# Patient Record
Sex: MALE | Race: WHITE | NOT HISPANIC OR LATINO
[De-identification: names, ages, dates, MRNs, and addresses within clinical notes are randomized per-mention and may not be internally consistent; named-entity substitution may affect disease eponyms.]

---

## 2017-02-27 ENCOUNTER — RX RENEWAL (OUTPATIENT)
Age: 62
End: 2017-02-27

## 2017-03-07 ENCOUNTER — APPOINTMENT (OUTPATIENT)
Dept: DERMATOLOGY | Facility: CLINIC | Age: 62
End: 2017-03-07

## 2017-03-10 ENCOUNTER — NON-APPOINTMENT (OUTPATIENT)
Age: 62
End: 2017-03-10

## 2017-03-10 ENCOUNTER — APPOINTMENT (OUTPATIENT)
Dept: INTERNAL MEDICINE | Facility: CLINIC | Age: 62
End: 2017-03-10

## 2017-03-10 VITALS — WEIGHT: 280 LBS | BODY MASS INDEX: 35.56 KG/M2 | HEIGHT: 74.5 IN

## 2017-03-10 DIAGNOSIS — D68.59 OTHER PRIMARY THROMBOPHILIA: ICD-10-CM

## 2017-03-10 DIAGNOSIS — Z87.438 PERSONAL HISTORY OF OTHER DISEASES OF MALE GENITAL ORGANS: ICD-10-CM

## 2017-03-10 DIAGNOSIS — Z00.00 ENCOUNTER FOR GENERAL ADULT MEDICAL EXAMINATION W/OUT ABNORMAL FINDINGS: ICD-10-CM

## 2017-03-11 ENCOUNTER — TRANSCRIPTION ENCOUNTER (OUTPATIENT)
Age: 62
End: 2017-03-11

## 2017-03-13 ENCOUNTER — RESULT REVIEW (OUTPATIENT)
Age: 62
End: 2017-03-13

## 2017-03-13 LAB
BILIRUB UR QL STRIP: NORMAL
CLARITY UR: CLEAR
COLLECTION METHOD: NORMAL
GLUCOSE UR-MCNC: NORMAL
HCG UR QL: 0.2 EU/DL
HGB UR QL STRIP.AUTO: NORMAL
KETONES UR-MCNC: NORMAL
LEUKOCYTE ESTERASE UR QL STRIP: NORMAL
NITRITE UR QL STRIP: NORMAL
PH UR STRIP: 7
PROT UR STRIP-MCNC: NORMAL
SP GR UR STRIP: 1.01

## 2017-08-01 ENCOUNTER — APPOINTMENT (OUTPATIENT)
Dept: GASTROENTEROLOGY | Facility: CLINIC | Age: 62
End: 2017-08-01
Payer: COMMERCIAL

## 2017-08-01 VITALS
HEART RATE: 92 BPM | SYSTOLIC BLOOD PRESSURE: 130 MMHG | WEIGHT: 263 LBS | HEIGHT: 74 IN | RESPIRATION RATE: 16 BRPM | OXYGEN SATURATION: 98 % | DIASTOLIC BLOOD PRESSURE: 80 MMHG | BODY MASS INDEX: 33.75 KG/M2

## 2017-08-01 DIAGNOSIS — Z80.0 FAMILY HISTORY OF MALIGNANT NEOPLASM OF DIGESTIVE ORGANS: ICD-10-CM

## 2017-08-01 PROCEDURE — 82270 OCCULT BLOOD FECES: CPT

## 2017-08-01 PROCEDURE — 99203 OFFICE O/P NEW LOW 30 MIN: CPT

## 2017-08-01 RX ORDER — SODIUM PICOSULFATE, MAGNESIUM OXIDE, AND ANHYDROUS CITRIC ACID 10; 3.5; 12 MG/16.2G; G/16.2G; G/16.2G
10-3.5-12 POWDER, METERED ORAL
Qty: 1 | Refills: 0 | Status: ACTIVE | COMMUNITY
Start: 2017-08-01 | End: 1900-01-01

## 2017-09-06 ENCOUNTER — RX RENEWAL (OUTPATIENT)
Age: 62
End: 2017-09-06

## 2017-09-06 RX ORDER — WARFARIN 10 MG/1
10 TABLET ORAL DAILY
Qty: 12 | Refills: 0 | Status: ACTIVE | COMMUNITY
Start: 2017-09-06 | End: 1900-01-01

## 2017-09-28 ENCOUNTER — APPOINTMENT (OUTPATIENT)
Dept: GASTROENTEROLOGY | Facility: GI CENTER | Age: 62
End: 2017-09-28

## 2018-03-13 ENCOUNTER — APPOINTMENT (OUTPATIENT)
Dept: DERMATOLOGY | Facility: CLINIC | Age: 63
End: 2018-03-13

## 2018-07-23 PROBLEM — Z80.0 FAMILY HISTORY OF COLON CANCER: Status: ACTIVE | Noted: 2017-08-01

## 2023-01-01 ENCOUNTER — INPATIENT (INPATIENT)
Facility: HOSPITAL | Age: 68
LOS: 0 days | DRG: 951 | End: 2023-07-30
Attending: HOSPITALIST | Admitting: HOSPITALIST
Payer: MEDICARE

## 2023-01-01 VITALS
SYSTOLIC BLOOD PRESSURE: 151 MMHG | DIASTOLIC BLOOD PRESSURE: 80 MMHG | OXYGEN SATURATION: 100 % | RESPIRATION RATE: 16 BRPM | HEART RATE: 53 BPM

## 2023-01-01 VITALS — HEART RATE: 57 BPM | WEIGHT: 220.02 LBS | OXYGEN SATURATION: 100 %

## 2023-01-01 DIAGNOSIS — I62.00 NONTRAUMATIC SUBDURAL HEMORRHAGE, UNSPECIFIED: ICD-10-CM

## 2023-01-01 LAB
ACANTHOCYTES BLD QL SMEAR: SIGNIFICANT CHANGE UP
ALBUMIN SERPL ELPH-MCNC: 4.4 G/DL — SIGNIFICANT CHANGE UP (ref 3.3–5.2)
ALP SERPL-CCNC: 231 U/L — HIGH (ref 40–120)
ALT FLD-CCNC: 23 U/L — SIGNIFICANT CHANGE UP
AMPHET UR-MCNC: NEGATIVE — SIGNIFICANT CHANGE UP
ANION GAP SERPL CALC-SCNC: 18 MMOL/L — HIGH (ref 5–17)
ANISOCYTOSIS BLD QL: SIGNIFICANT CHANGE UP
APTT BLD: 49.1 SEC — HIGH (ref 24.5–35.6)
AST SERPL-CCNC: 45 U/L — HIGH
BARBITURATES UR SCN-MCNC: NEGATIVE — SIGNIFICANT CHANGE UP
BASOPHILS # BLD AUTO: 0 K/UL — SIGNIFICANT CHANGE UP (ref 0–0.2)
BASOPHILS NFR BLD AUTO: 0 % — SIGNIFICANT CHANGE UP (ref 0–2)
BENZODIAZ UR-MCNC: NEGATIVE — SIGNIFICANT CHANGE UP
BILIRUB SERPL-MCNC: 0.9 MG/DL — SIGNIFICANT CHANGE UP (ref 0.4–2)
BUN SERPL-MCNC: 15.7 MG/DL — SIGNIFICANT CHANGE UP (ref 8–20)
CALCIUM SERPL-MCNC: 9.1 MG/DL — SIGNIFICANT CHANGE UP (ref 8.4–10.5)
CHLORIDE SERPL-SCNC: 103 MMOL/L — SIGNIFICANT CHANGE UP (ref 96–108)
CO2 SERPL-SCNC: 18 MMOL/L — LOW (ref 22–29)
COCAINE METAB.OTHER UR-MCNC: NEGATIVE — SIGNIFICANT CHANGE UP
CREAT SERPL-MCNC: 0.67 MG/DL — SIGNIFICANT CHANGE UP (ref 0.5–1.3)
DACRYOCYTES BLD QL SMEAR: SIGNIFICANT CHANGE UP
EGFR: 100 ML/MIN/1.73M2 — SIGNIFICANT CHANGE UP
ELLIPTOCYTES BLD QL SMEAR: SIGNIFICANT CHANGE UP
EOSINOPHIL # BLD AUTO: 0 K/UL — SIGNIFICANT CHANGE UP (ref 0–0.5)
EOSINOPHIL NFR BLD AUTO: 0 % — SIGNIFICANT CHANGE UP (ref 0–6)
ETHANOL SERPL-MCNC: <10 MG/DL — SIGNIFICANT CHANGE UP (ref 0–9)
GIANT PLATELETS BLD QL SMEAR: PRESENT — SIGNIFICANT CHANGE UP
GLUCOSE SERPL-MCNC: 200 MG/DL — HIGH (ref 70–99)
HCT VFR BLD CALC: 36.2 % — LOW (ref 39–50)
HGB BLD-MCNC: 11.6 G/DL — LOW (ref 13–17)
INR BLD: 5 RATIO — CRITICAL HIGH (ref 0.85–1.18)
LYMPHOCYTES # BLD AUTO: 2.47 K/UL — SIGNIFICANT CHANGE UP (ref 1–3.3)
LYMPHOCYTES # BLD AUTO: 36.6 % — SIGNIFICANT CHANGE UP (ref 13–44)
MANUAL SMEAR VERIFICATION: SIGNIFICANT CHANGE UP
MCHC RBC-ENTMCNC: 27.7 PG — SIGNIFICANT CHANGE UP (ref 27–34)
MCHC RBC-ENTMCNC: 32 GM/DL — SIGNIFICANT CHANGE UP (ref 32–36)
MCV RBC AUTO: 86.4 FL — SIGNIFICANT CHANGE UP (ref 80–100)
METAMYELOCYTES # FLD: 8 % — HIGH (ref 0–0)
METHADONE UR-MCNC: NEGATIVE — SIGNIFICANT CHANGE UP
MICROCYTES BLD QL: SIGNIFICANT CHANGE UP
MONOCYTES # BLD AUTO: 0.3 K/UL — SIGNIFICANT CHANGE UP (ref 0–0.9)
MONOCYTES NFR BLD AUTO: 4.5 % — SIGNIFICANT CHANGE UP (ref 2–14)
MYELOCYTES NFR BLD: 4.5 % — HIGH (ref 0–0)
NEUTROPHILS # BLD AUTO: 3.14 K/UL — SIGNIFICANT CHANGE UP (ref 1.8–7.4)
NEUTROPHILS NFR BLD AUTO: 45.5 % — SIGNIFICANT CHANGE UP (ref 43–77)
NEUTS BAND # BLD: 0.9 % — SIGNIFICANT CHANGE UP (ref 0–8)
NRBC # BLD: 14 /100 — HIGH (ref 0–0)
OPIATES UR-MCNC: NEGATIVE — SIGNIFICANT CHANGE UP
OVALOCYTES BLD QL SMEAR: SIGNIFICANT CHANGE UP
PCP SPEC-MCNC: SIGNIFICANT CHANGE UP
PCP UR-MCNC: NEGATIVE — SIGNIFICANT CHANGE UP
PLAT MORPH BLD: NORMAL — SIGNIFICANT CHANGE UP
PLATELET # BLD AUTO: SIGNIFICANT CHANGE UP K/UL (ref 150–400)
POIKILOCYTOSIS BLD QL AUTO: SIGNIFICANT CHANGE UP
POLYCHROMASIA BLD QL SMEAR: SIGNIFICANT CHANGE UP
POTASSIUM SERPL-MCNC: 4.3 MMOL/L — SIGNIFICANT CHANGE UP (ref 3.5–5.3)
POTASSIUM SERPL-SCNC: 4.3 MMOL/L — SIGNIFICANT CHANGE UP (ref 3.5–5.3)
PROT SERPL-MCNC: 7.6 G/DL — SIGNIFICANT CHANGE UP (ref 6.6–8.7)
PROTHROM AB SERPL-ACNC: 53 SEC — HIGH (ref 9.5–13)
RBC # BLD: 4.19 M/UL — LOW (ref 4.2–5.8)
RBC # FLD: 21.5 % — HIGH (ref 10.3–14.5)
RBC BLD AUTO: SIGNIFICANT CHANGE UP
SCHISTOCYTES BLD QL AUTO: SIGNIFICANT CHANGE UP
SODIUM SERPL-SCNC: 139 MMOL/L — SIGNIFICANT CHANGE UP (ref 135–145)
THC UR QL: NEGATIVE — SIGNIFICANT CHANGE UP
TROPONIN T SERPL-MCNC: <0.01 NG/ML — SIGNIFICANT CHANGE UP (ref 0–0.06)
WBC # BLD: 6.76 K/UL — SIGNIFICANT CHANGE UP (ref 3.8–10.5)
WBC # FLD AUTO: 6.76 K/UL — SIGNIFICANT CHANGE UP (ref 3.8–10.5)

## 2023-01-01 PROCEDURE — 70496 CT ANGIOGRAPHY HEAD: CPT | Mod: 26,MA

## 2023-01-01 PROCEDURE — 99222 1ST HOSP IP/OBS MODERATE 55: CPT

## 2023-01-01 PROCEDURE — 70498 CT ANGIOGRAPHY NECK: CPT | Mod: 26,MA

## 2023-01-01 PROCEDURE — 31500 INSERT EMERGENCY AIRWAY: CPT

## 2023-01-01 PROCEDURE — 72125 CT NECK SPINE W/O DYE: CPT | Mod: 26

## 2023-01-01 PROCEDURE — 93010 ELECTROCARDIOGRAM REPORT: CPT

## 2023-01-01 PROCEDURE — 99291 CRITICAL CARE FIRST HOUR: CPT | Mod: 25

## 2023-01-01 PROCEDURE — 71045 X-RAY EXAM CHEST 1 VIEW: CPT | Mod: 26

## 2023-01-01 PROCEDURE — 70450 CT HEAD/BRAIN W/O DYE: CPT | Mod: 26,MA

## 2023-01-01 RX ORDER — PROTHROMBIN COMPLEX CONCENTRATE (HUMAN) 25.5; 16.5; 24; 22; 22; 26 [IU]/ML; [IU]/ML; [IU]/ML; [IU]/ML; [IU]/ML; [IU]/ML
1500 POWDER, FOR SOLUTION INTRAVENOUS ONCE
Refills: 0 | Status: COMPLETED | OUTPATIENT
Start: 2023-01-01 | End: 2023-01-01

## 2023-01-01 RX ORDER — FENTANYL CITRATE 50 UG/ML
100 INJECTION INTRAVENOUS ONCE
Refills: 0 | Status: DISCONTINUED | OUTPATIENT
Start: 2023-01-01 | End: 2023-01-01

## 2023-01-01 RX ORDER — CHLORHEXIDINE GLUCONATE 213 G/1000ML
15 SOLUTION TOPICAL EVERY 12 HOURS
Refills: 0 | Status: DISCONTINUED | OUTPATIENT
Start: 2023-01-01 | End: 2023-01-01

## 2023-01-01 RX ORDER — MORPHINE SULFATE 50 MG/1
2 CAPSULE, EXTENDED RELEASE ORAL EVERY 4 HOURS
Refills: 0 | Status: DISCONTINUED | OUTPATIENT
Start: 2023-01-01 | End: 2023-01-01

## 2023-01-01 RX ORDER — HYDRALAZINE HCL 50 MG
10 TABLET ORAL ONCE
Refills: 0 | Status: COMPLETED | OUTPATIENT
Start: 2023-01-01 | End: 2023-01-01

## 2023-01-01 RX ORDER — ETOMIDATE 2 MG/ML
20 INJECTION INTRAVENOUS ONCE
Refills: 0 | Status: COMPLETED | OUTPATIENT
Start: 2023-01-01 | End: 2023-01-01

## 2023-01-01 RX ORDER — SUCCINYLCHOLINE CHLORIDE 100 MG/5ML
100 SYRINGE (ML) INTRAVENOUS ONCE
Refills: 0 | Status: COMPLETED | OUTPATIENT
Start: 2023-01-01 | End: 2023-01-01

## 2023-01-01 RX ORDER — PHYTONADIONE (VIT K1) 5 MG
10 TABLET ORAL ONCE
Refills: 0 | Status: COMPLETED | OUTPATIENT
Start: 2023-01-01 | End: 2023-01-01

## 2023-01-01 RX ORDER — NICARDIPINE HYDROCHLORIDE 30 MG/1
5 CAPSULE, EXTENDED RELEASE ORAL
Qty: 40 | Refills: 0 | Status: DISCONTINUED | OUTPATIENT
Start: 2023-01-01 | End: 2023-01-01

## 2023-01-01 RX ORDER — PROPOFOL 10 MG/ML
5 INJECTION, EMULSION INTRAVENOUS
Qty: 1000 | Refills: 0 | Status: DISCONTINUED | OUTPATIENT
Start: 2023-01-01 | End: 2023-01-01

## 2023-01-01 RX ADMIN — NICARDIPINE HYDROCHLORIDE 5 MG/HR: 30 CAPSULE, EXTENDED RELEASE ORAL at 11:00

## 2023-01-01 RX ADMIN — FENTANYL CITRATE 100 MICROGRAM(S): 50 INJECTION INTRAVENOUS at 11:30

## 2023-01-01 RX ADMIN — Medication 10 MILLIGRAM(S): at 10:48

## 2023-01-01 RX ADMIN — PROPOFOL 5 MICROGRAM(S)/KG/MIN: 10 INJECTION, EMULSION INTRAVENOUS at 11:00

## 2023-01-01 RX ADMIN — ETOMIDATE 20 MILLIGRAM(S): 2 INJECTION INTRAVENOUS at 09:38

## 2023-01-01 RX ADMIN — PROPOFOL 2.4 MICROGRAM(S)/KG/MIN: 10 INJECTION, EMULSION INTRAVENOUS at 10:36

## 2023-01-01 RX ADMIN — FENTANYL CITRATE 100 MICROGRAM(S): 50 INJECTION INTRAVENOUS at 10:45

## 2023-01-01 RX ADMIN — Medication 100 MILLIGRAM(S): at 09:39

## 2023-01-01 RX ADMIN — NICARDIPINE HYDROCHLORIDE 25 MG/HR: 30 CAPSULE, EXTENDED RELEASE ORAL at 10:36

## 2023-07-30 NOTE — H&P ADULT - NSHPPHYSICALEXAM_GEN_ALL_CORE
Vital Signs Last 24 Hrs  T(C): --  T(F): --  HR: 53 (30 Jul 2023 11:00) (53 - 57)  BP: 151/80 (30 Jul 2023 11:00) (151/80 - 197/101)  BP(mean): --  RR: 16 (30 Jul 2023 11:00) (16 - 16)  SpO2: 100% (30 Jul 2023 11:00) (100% - 100%)    Parameters below as of 30 Jul 2023 11:16      O2 Concentration (%): 0.2    General appearance: No acute distress, Unresponsive  HEENT: Normocephalic, Atraumatic, Conjunctiva clear, Pupils unreactive  Neck: Supple, No JVD  Lungs: Breath sound equal bilaterally, No wheezes, No rales  Cardiovascular: S1S2, Regular rhythm  Abdomen: Soft, Nondistended, No guarding, Positive bowel sounds  Extremities: No clubbing, No cyanosis, No edema  Neuro: No tremors  Psychiatric: Unresponsive

## 2023-07-30 NOTE — DISCHARGE NOTE FOR THE EXPIRED PATIENT - OTHER SIGNIFICANT FINDINGS
Corneal Reflexes: Absent OU   Carotids: Absent: B/L   Heart Sounds: Absent   Lungs: Absent Lung sounds B/L     Pt  At 22:38  Wife Malu Christian at Bedside   Declines Autopsy

## 2023-07-30 NOTE — ED PROVIDER NOTE - CLINICAL SUMMARY MEDICAL DECISION MAKING FREE TEXT BOX
70 y/o male "hx of clotting on warfarin" per ems found unresponsive, unclear last known well as pt lives alone. snoring respirations, hypertense, nl fs. intubated on arrival for airway protection, code stroke, concern for bleed.

## 2023-07-30 NOTE — CONSULT NOTE ADULT - ASSESSMENT
71 y/o male found with change in mental status at home, BIBA non verbal & on responsive. Intubated in the ED, imaging showed devastating large right acute SDH. Wife at bedside, decided on non aggressive surgical management & would proceed for comfort care     1. Admit to medicine  2. GOC discussed  3. Terminal extubation   4. No acute neurosurgical intervention  Signing off

## 2023-07-30 NOTE — DISCHARGE NOTE FOR THE EXPIRED PATIENT - HOSPITAL COURSE
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 As per wife the patient was noted to have been in his usual state of health with only complaints of a mild headache and nausea a few days prior. He was later found to be unresponsive with some rigidity and urinary incontinence. Pts wife Denies trauma. The patient was intubated by EMS and brought to the hospital. CT of the head was notable for a large subdural hematoma. The patient was evaluated by Trauma Surgery and Neurosurgery. After discussion with the family in regards to the CT findings and overall prognosis, the patient's family requested to pursue comfort measures and did not wish to pursue surgery. The patient was extubated and admitted to the hospital with comfort Care measures.

## 2023-07-30 NOTE — ED PROVIDER NOTE - NS_EDPROVIDERDISPOUSERTYPE_ED_A_ED
Attending Attestation (For Attendings USE Only)... Oral Minoxidil Pregnancy And Lactation Text: This medication should only be used when clearly needed if you are pregnant, attempting to become pregnant or breast feeding.

## 2023-07-30 NOTE — ED PROVIDER NOTE - OBJECTIVE STATEMENT
68 y/o male unknown pmh ?on warfarin per ems found unresponsive in house with snoring respirations. per ems live salone, neighbor went over to check on pt. no other history provided. pt arrived hypertense, fs 200. right pupil dilated.

## 2023-07-30 NOTE — ED PROCEDURE NOTE - CPROC ED NUMBER OF ATTEMPTS1
Bedside shift change report given to S. Lowery Primrose (oncoming nurse) by Les Salazar (offgoing nurse). Report included the following information SBAR, Kardex and MAR. 1

## 2023-07-30 NOTE — H&P ADULT - HISTORY OF PRESENT ILLNESS
72M with a history of protein S deficiency on warfarin and sarcoma who presented with altered mental status. Information was obtained from discussion with the patient's family at the bedside. The patient was noted to have been in his usual state of health with only complaints of a mild headache and nausea a few days prior. He was later found to be unresponsive with some rigidity and urinary incontinence. The patient was intubated by EMS and brought to the hospital. CT of the head was notable for a large subdural hematoma. The patient was evaluated by Trauma Surgery and Neurosurgery. After discussion with the family in regards to the CT findings and overall prognosis, the patient's family requested to pursue comfort measures and did not wish to pursue surgery. The patient was extubated and admitted to the hospital.

## 2023-07-30 NOTE — H&P ADULT - NSHPSOCIALHISTORY_GEN_ALL_CORE
PMH: Protein S deficiency, Sarcoma  PSH: Sarcoma resection on the scalp  Social History: No alcohol abuse    Lives with his wife

## 2023-07-30 NOTE — CONSULT NOTE ADULT - ASSESSMENT
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 Initially a code stroke, noted to have a large R SDH with left shift. Coagulopathic (INR 5, reversed with K-centra and vitamin K) Now intubated, hypertensive and bradycardic. Family at bedside, and wife states that patient would not want any intervention.     Plan:   - NSGY to discuss final plan, GOC with family   - No additional imaging indicated at this time from trauma perspective   - Per wife, patient did not fall progressive confusion. No evidence of acute trauma.    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 Initially a code stroke, noted to have a large R SDH with left shift. Coagulopathic (INR 5, reversed with K-centra and vitamin K) Now intubated, hypertensive and bradycardic. Family at bedside, and wife states that patient would not want any intervention.     Plan:   - Per wife, patient did not fall progressive confusion. No evidence of acute trauma.   - NSGY discussed intervention. Family refuses intervention , stating that it would be against patient wishes. Terminally extubated in ED

## 2023-07-30 NOTE — ED PROVIDER NOTE - PROGRESS NOTE DETAILS
Kenny: pt delay to ct because not protecting airway needed intubation, with simultaneous code stroke finishing at ct. Kenny: pt name Gustavo Christian 1955. wife Malu and daughter Danii bedside, neurosurg discussed surg with family and they would like no surgery or intervention given poor prognosis. would like pt to not be going through any discomfort and would like extubation and comfort care.     history provided: hx of sarcoma and protein s deficiency on coumadin, was c/o some nasuea/ ?headache past few days, no known trauma, no concern for drugs/ etoh. was responsive and awake last night. Kenny: pt was extubated for comfort care at family request. molst filled out. pt admitted for comfort care measures.

## 2023-07-30 NOTE — ED ADULT NURSE NOTE - OBJECTIVE STATEMENT
pt BIBA found on floor for unknown amount of time. pt arrives gcs 3 snoring respirations. pt intubated in critical care. possibly on warfain

## 2023-07-30 NOTE — GOALS OF CARE CONVERSATION - ADVANCED CARE PLANNING - CONVERSATION DETAILS
pt with large subdural hemorrhage with shift, neurosurgery discussed operation with family, they don't' want him to go through that given poor prognosis, opting for no interventions and for terminal extubation to minmize his suffereing the prognosis is very poor with interventions.  pt to be extubated and comfort care at pt wishes. daughter and wife bedside.

## 2023-07-30 NOTE — ED PROVIDER NOTE - PHYSICAL EXAMINATION
Gen: unresponsive   HEENT: Mucous membranes moist, pink conjunctivae, EOMI. right pupil dilated 6mm, left pupil 3mm.   CV: RRR, nl s1/s2.  Resp: CTAB, normal rate and effort  GI: Abdomen soft, NT, ND. No rebound, no guarding  : No CVAT  Neuro: unresponsive, not moving extremities  MSK: No spine or joint tenderness to palpation  Skin: No rashes. intact and perfused.
Ambulatory

## 2023-07-30 NOTE — H&P ADULT - ASSESSMENT
72M with a history of protein S deficiency on warfarin and sarcoma with resection who presented with unresponsiveness found to have a large right subdural hematoma.    Subdural hematoma - Prior discussion with family noted. They did not wish to pursue surgical intervention. Prognosis was thought to be poor and comfort measures were requested by the family. Morphine and lorazepam as needed for comfort. Palliative Care consultation.    Protein S deficiency - The patient was on warfarin with supratherapeutic INR on presentation.    Discussed with the patient's family at the bedside, expressed understanding of the grave prognosis and confirmed wishes for comfort measures.

## 2023-07-30 NOTE — ED PROVIDER NOTE - ATTENDING CONTRIBUTION TO CARE
Kenny: I performed a face to face evaluation of this patient and performed a full history and physical examination on the patient.  I agree with the resident's history, physical examination, and plan of the patient unless otherwise noted. My brief assessment is as follows: see note

## 2023-07-30 NOTE — CONSULT NOTE ADULT - ATTENDING COMMENTS
above noted.  Case discussed with ER physician who stated that the family has opted for no aggressive measures and for terminal extubation.  Will not actively see or follow.

## 2023-07-30 NOTE — CONSULT NOTE ADULT - SUBJECTIVE AND OBJECTIVE BOX
Trauma Consult     Consulting attending: Dr. 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 Per wife, she found patient at home sitting on floor (at this time, she thought he was attempting to play with grandchild). However, she noted that he was confused and urinated on  himself, and was unable to move . Per wife, no witnessed trauma and /or falls. Patient brought into ED, intially as a code stroke. CT demonstrates a large R SDH with mass effect and L midline shift.  Intubated in ED, sedated on prop gtt. Noted to be coagulopathic (INR 5), given K-centra and vitamin k . Hypertensive systolics 187, bradycardic ( HR 57). BP managed with hydralazine PRN and nicardipine gtt. Patient with no additional traumatic injuries. Trauma consulted for clearance, per NSGY  patient was likely going to OR for decompression pending family discussion.    Wife and daughter at bedside, who states that due to patient wishes they would not like any intervention at this time. Further GOC with NSGY team.       PAST MEDICAL HISTORY:  Sacoma (brain) s/p chemo   DVT (on coumadin)           MEDICATIONS:  chlorhexidine 0.12% Liquid 15 milliLiter(s) Oral Mucosa every 12 hours  [id number]    Injectable 100 MICROGram(s) IV Push Once  hydrALAZINE Injectable 10 milliGRAM(s) IV Push Once  niCARdipine Infusion 5 mG/Hr IV Continuous <Continuous>  phytonadione  IVPB 10 milliGRAM(s) IV Intermittent once  propofol Infusion 5 MICROgram(s)/kG/Min IV Continuous <Continuous>  prothrombin complex concentrate IVPB (KCENTRA) 1500 International Unit(s) IV Intermittent once        ALLERGIES:  No Known Allergies        VITALS & I/Os:  Vital Signs Last 24 Hrs  T(C): --  T(F): --  HR: 57 (30 Jul 2023 10:18) (57 - 57)  BP: 187/56 (30 Jul 2023 10:25) (187/56 - 187/56)  BP(mean): --  RR: --  SpO2: 100% (30 Jul 2023 10:18) (100% - 100%)      Mode: AC/ CMV (Assist Control/ Continuous Mandatory Ventilation)  RR (machine): 16  TV (machine): 500  FiO2: 30  PEEP: 5  ITime: 0.8  MAP: 9  PIP: 20    I&O's Summary        PHYSICAL EXAM:  HEENT: no external hematoma and/or lacerations, sarcoma lesion   Respiratory: intubated, sedated.  Gastrointestinal: Abdomen soft, non-distended  Neurological: GCS 3T, prop gtt   Psychiatric: Normal mood, normal affect  Musculoskeletal: no obvious deformities of ext          LABS:                        11.6   6.76  )-----------( CLUMPED    ( 30 Jul 2023 09:35 )             36.2     07-30    139  |  103  |  15.7  ----------------------------<  200<H>  4.3   |  18.0<L>  |  0.67    Ca    9.1      30 Jul 2023 09:35    TPro  7.6  /  Alb  4.4  /  TBili  0.9  /  DBili  x   /  AST  45<H>  /  ALT  23  /  AlkPhos  231<H>  07-30    Lactate:    PT/INR - ( 30 Jul 2023 09:35 )   PT: 53.0 sec;   INR: 5.00 ratio         PTT - ( 30 Jul 2023 09:35 )  PTT:49.1 sec    CARDIAC MARKERS ( 30 Jul 2023 09:35 )  x     / <0.01 ng/mL / x     / x     / x            Urinalysis Basic - ( 30 Jul 2023 09:35 )    Color: x / Appearance: x / SG: x / pH: x  Gluc: 200 mg/dL / Ketone: x  / Bili: x / Urobili: x   Blood: x / Protein: x / Nitrite: x   Leuk Esterase: x / RBC: x / WBC x   Sq Epi: x / Non Sq Epi: x / Bacteria: x          IMAGING:  < from: Xray Chest 1 View-PORTABLE IMMEDIATE (Xray Chest 1 View-PORTABLE IMMEDIATE .) (07.30.23 @ 10:19) >  Findings:    Endotracheal tube in place with the tip in the mid thoracic trachea.   Nasogastric tube is in the right mainstem bronchus.    The lungs are clear. There is no pneumothorax or pleural effusion. The   cardiomediastinal silhouette is normal. Bones are grossly normal.    < end of copied text >  < from: CT Angio Brain Stroke Protocol  w/ IV Cont (07.30.23 @ 10:11) >  MPRESSION:    Intracranial CTA: No large vessel occlusion or high-grade stenosis. No   evidence of aneurysm or high flow vascular malformation.    Extracranial CTA: No significant steno-occlusive disease.    < end of copied text >  < from: CT Brain Stroke Protocol (07.30.23 @ 10:04) >  Impression:    Large right cerebral convexity and right tentorial subdural hematoma with   significant mass effect and 2.4 cm of right to left midline shift..      
      HISTORY OF PRESENT ILLNESS:     72yM PMH of a clotting disorder on coumadin,  presents via EMS, found by wife, down on the floor, confused, not able to move. Patient arrived non verbal & non responsive. He was Intubated in the ED for airway protection & Ct brain done which showed large right acute SDH.     Wife states he was c/o N/V few days ago but assumed it was from GERD. Wife also states he has been "clammy"  the past 24 hrs     Wife & daughter present & involved in decision making & goals of care. 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  HTN      FAMILY HISTORY: non contributory       SOCIAL HISTORY: History obtained by wife   Tobacco Use: denies  EtOH use: denies  Substance: denies    Allergies    No Known Allergies    Intolerances        REVIEW OF SYSTEMS unable to assess ROS secondary to pts mental status         HOME MEDICATIONS:  Home Medications:  Coumadin       Neuro:  propofol Infusion 5 MICROgram(s)/kG/Min IV Continuous <Continuous>    Anticoagulation:    OTHER:  chlorhexidine 0.12% Liquid 15 milliLiter(s) Oral Mucosa every 12 hours  niCARdipine Infusion 5 mG/Hr IV Continuous <Continuous>    IVF:      Vital Signs Last 24 Hrs  T(C): --  T(F): --  HR: 53 (30 Jul 2023 11:00) (53 - 57)  BP: 151/80 (30 Jul 2023 11:00) (151/80 - 197/101)  BP(mean): --  RR: 16 (30 Jul 2023 11:00) (16 - 16)  SpO2: 100% (30 Jul 2023 11:00) (100% - 100%)    Parameters below as of 30 Jul 2023 11:16      O2 Concentration (%): 0.2      PHYSICAL EXAM:  GENERAL: NAD, well-groomed, well-developed  HEAD:  Atraumatic, normocephalic  ENMT: moist mucous membranes, good dentition, no lesions  NECK: In collar   GALA COMA SCORE: E-1 V-1T M-1 =3T       E:  1= no opening       V:  nonverbal 1T= intubated       M:  1= no movement  MENTAL STATUS: Comatose, Intubated, Nonverbal, not following commands. No response or movement  to noxious stimuli   CRANIAL NERVES: Pupils 5mm, fixed bilateral   REFLEXES: PERRL. Corneals intact b/l. Gag intact.      LABS:                        11.6   6.76  )-----------( CLUMPED    ( 30 Jul 2023 09:35 )             36.2     07-30    139  |  103  |  15.7  ----------------------------<  200<H>  4.3   |  18.0<L>  |  0.67    Ca    9.1      30 Jul 2023 09:35    TPro  7.6  /  Alb  4.4  /  TBili  0.9  /  DBili  x   /  AST  45<H>  /  ALT  23  /  AlkPhos  231<H>  07-30    PT/INR - ( 30 Jul 2023 09:35 )   PT: 53.0 sec;   INR: 5.00 ratio         PTT - ( 30 Jul 2023 09:35 )  PTT:49.1 sec  Urinalysis Basic - ( 30 Jul 2023 09:35 )    Color: x / Appearance: x / SG: x / pH: x  Gluc: 200 mg/dL / Ketone: x  / Bili: x / Urobili: x   Blood: x / Protein: x / Nitrite: x   Leuk Esterase: x / RBC: x / WBC x   Sq Epi: x / Non Sq Epi: x / Bacteria: x        CULTURES:      RADIOLOGY & ADDITIONAL STUDIES:    CT Brain Stroke Protocol (07.30.23 @ 10:04)   There is a large acute subdural hematoma along the right cerebral   convexity measuring up to 2.8 cm in transverse diameter. There is   component along the right tentorium cerebelli measuring up to 1.6 cm in   diameter. There is mass effect on the adjacent sulci with 2.4 cm of   right-to-left midline shift. There is effacement of the basal cisterns.    There is effacement of the right lateral ventricle with dilatation of the   left lateral ventricle which may be entrapped..    The gray-white matter differentiation is grossly preserved..    There is no acute calvarial fracture..    ET tube is partially visualized. There is mucosal thickening of the   paranasal sinuses. The mastoid air cells are well-aerated..    Impression:    Large right cerebral convexity and right tentorial subdural hematoma with   significant mass effect and 2.4 cm of right to left midline shift..          CAPRINI SCORE [CLOT]:  Patient has an estimated Caprini score of greater than 5.  However, the patient's unique clinical situation will be addressed in an individual manner to determine appropriate anticoagulation treatment, if any.